# Patient Record
(demographics unavailable — no encounter records)

---

## 2021-10-26 NOTE — REP
INDICATION:

DYSPNEA, UNSPECIFIED.



COMPARISON:

None.



TECHNIQUE:

PA and lateral



FINDINGS:

The superior mediastinal structures are midline.  The cardiac silhouette is

unremarkable in size, shape, and position.  The diaphragmatic surfaces of the lungs

are regular, and the costophrenic angles are clear.  The pulmonary fields are clear.

The imaged osseous structures are intact.





IMPRESSION:

There is no acute cardiopulmonary disease.





<Electronically signed by Rajesh Potter > 10/26/21 [id number]